# Patient Record
Sex: FEMALE | URBAN - METROPOLITAN AREA
[De-identification: names, ages, dates, MRNs, and addresses within clinical notes are randomized per-mention and may not be internally consistent; named-entity substitution may affect disease eponyms.]

---

## 2023-03-30 ENCOUNTER — APPOINTMENT (RX ONLY)
Dept: URBAN - METROPOLITAN AREA CLINIC 55 | Facility: CLINIC | Age: 23
Setting detail: DERMATOLOGY
End: 2023-03-30

## 2023-03-30 DIAGNOSIS — Z41.9 ENCOUNTER FOR PROCEDURE FOR PURPOSES OTHER THAN REMEDYING HEALTH STATE, UNSPECIFIED: ICD-10-CM

## 2023-03-30 PROCEDURE — ? IN-HOUSE DISPENSING PHARMACY

## 2023-03-30 PROCEDURE — ? INVENTORY

## 2023-03-30 PROCEDURE — ? COSMETIC CONSULTATION: GENERAL

## 2023-03-30 NOTE — PROCEDURE: IN-HOUSE DISPENSING PHARMACY
Product 58 Amount/Unit (Numbers Only): 0
Product 7 Application Directions: Apply only as directed
Product 6 Unit Type: ml
Name Of Product 2: Dapsone / Spironolactone Gel
Product 47 Unit Type: mg
Name Of Product 5: Hydrating Tretinoin 0.025% Cream
Name Of Product 6: Rosacea Triple Combination Gel
Product 6 Amount/Unit (Numbers Only): 30
Name Of Product 1: Anti-Aging Brightening Cream
Product 3 Refills: 11
Product 1 Application Directions: Apply nightly or as directed. Use SPF daily.
Render Refills If Set To 0: Yes
Name Of Product 4: Hydroquinone 8% Emulsion
Product 4 Application Directions: Apply nightly or as directed.
Product 7 Refills: 2
Send Charges To Patient Encounter: No
Detail Level: Zone
Name Of Product 3: Acne Triple Combination Gel
Product 7 Units Dispensed: 1
Name Of Product 7: Lidocaine 23% / Tetracaine 7% Cream

## 2023-04-28 ENCOUNTER — APPOINTMENT (RX ONLY)
Dept: URBAN - METROPOLITAN AREA CLINIC 55 | Facility: CLINIC | Age: 23
Setting detail: DERMATOLOGY
End: 2023-04-28

## 2023-04-28 DIAGNOSIS — Z41.9 ENCOUNTER FOR PROCEDURE FOR PURPOSES OTHER THAN REMEDYING HEALTH STATE, UNSPECIFIED: ICD-10-CM

## 2023-04-28 PROCEDURE — ? MICRONEEDLING

## 2023-04-28 PROCEDURE — ? INVENTORY

## 2023-04-28 NOTE — PROCEDURE: MICRONEEDLING
Post-Care Instructions: Alastin Skin Nectar and SPF applied immediately post procedure. \\nAfter care instruction provided to patient.
Depth In Mm (Location #3): 0.1
Detail Level: Zone
Topical Anesthesia?: 20% benzocaine, 8% lidocaine, 4% tetracaine
Treatment Number (Optional): 0
Consent obtained, risks reviewed.